# Patient Record
Sex: MALE | Race: BLACK OR AFRICAN AMERICAN | NOT HISPANIC OR LATINO | URBAN - METROPOLITAN AREA
[De-identification: names, ages, dates, MRNs, and addresses within clinical notes are randomized per-mention and may not be internally consistent; named-entity substitution may affect disease eponyms.]

---

## 2024-07-09 ENCOUNTER — APPOINTMENT (OUTPATIENT)
Dept: PRIMARY CARE | Facility: CLINIC | Age: 24
End: 2024-07-09
Payer: COMMERCIAL

## 2024-07-09 ENCOUNTER — LAB (OUTPATIENT)
Dept: LAB | Facility: LAB | Age: 24
End: 2024-07-09
Payer: COMMERCIAL

## 2024-07-09 VITALS — WEIGHT: 166 LBS | SYSTOLIC BLOOD PRESSURE: 102 MMHG | DIASTOLIC BLOOD PRESSURE: 70 MMHG | BODY MASS INDEX: 28.49 KG/M2

## 2024-07-09 DIAGNOSIS — N63.20 MASS OF LEFT BREAST, UNSPECIFIED QUADRANT: ICD-10-CM

## 2024-07-09 DIAGNOSIS — Z00.00 HEALTHCARE MAINTENANCE: ICD-10-CM

## 2024-07-09 DIAGNOSIS — Z00.00 HEALTHCARE MAINTENANCE: Primary | ICD-10-CM

## 2024-07-09 DIAGNOSIS — M79.89 LEG SWELLING: ICD-10-CM

## 2024-07-09 DIAGNOSIS — I73.9 CLAUDICATION (CMS-HCC): ICD-10-CM

## 2024-07-09 LAB
ALBUMIN SERPL BCP-MCNC: 4.6 G/DL (ref 3.4–5)
ALP SERPL-CCNC: 52 U/L (ref 33–120)
ALT SERPL W P-5'-P-CCNC: 17 U/L (ref 10–52)
ANION GAP SERPL CALC-SCNC: 13 MMOL/L (ref 10–20)
AST SERPL W P-5'-P-CCNC: 17 U/L (ref 9–39)
BASOPHILS # BLD AUTO: 0.03 X10*3/UL (ref 0–0.1)
BASOPHILS NFR BLD AUTO: 0.6 %
BILIRUB SERPL-MCNC: 0.7 MG/DL (ref 0–1.2)
BUN SERPL-MCNC: 20 MG/DL (ref 6–23)
CALCIUM SERPL-MCNC: 9.7 MG/DL (ref 8.6–10.6)
CCP IGG SERPL-ACNC: <1 U/ML
CHLORIDE SERPL-SCNC: 103 MMOL/L (ref 98–107)
CHOLEST SERPL-MCNC: 166 MG/DL (ref 0–199)
CHOLESTEROL/HDL RATIO: 3.5
CO2 SERPL-SCNC: 26 MMOL/L (ref 21–32)
CREAT SERPL-MCNC: 0.91 MG/DL (ref 0.5–1.3)
EGFRCR SERPLBLD CKD-EPI 2021: >90 ML/MIN/1.73M*2
EOSINOPHIL # BLD AUTO: 0.13 X10*3/UL (ref 0–0.7)
EOSINOPHIL NFR BLD AUTO: 2.7 %
ERYTHROCYTE [DISTWIDTH] IN BLOOD BY AUTOMATED COUNT: 12.2 % (ref 11.5–14.5)
EST. AVERAGE GLUCOSE BLD GHB EST-MCNC: 100 MG/DL
GLUCOSE SERPL-MCNC: 90 MG/DL (ref 74–99)
HBA1C MFR BLD: 5.1 %
HCT VFR BLD AUTO: 44.6 % (ref 41–52)
HDLC SERPL-MCNC: 47.6 MG/DL
HGB BLD-MCNC: 15.1 G/DL (ref 13.5–17.5)
IMM GRANULOCYTES # BLD AUTO: 0.02 X10*3/UL (ref 0–0.7)
IMM GRANULOCYTES NFR BLD AUTO: 0.4 % (ref 0–0.9)
LDLC SERPL CALC-MCNC: 105 MG/DL
LYMPHOCYTES # BLD AUTO: 2.2 X10*3/UL (ref 1.2–4.8)
LYMPHOCYTES NFR BLD AUTO: 45.1 %
MAGNESIUM SERPL-MCNC: 1.85 MG/DL (ref 1.6–2.4)
MCH RBC QN AUTO: 29 PG (ref 26–34)
MCHC RBC AUTO-ENTMCNC: 33.9 G/DL (ref 32–36)
MCV RBC AUTO: 86 FL (ref 80–100)
MONOCYTES # BLD AUTO: 0.44 X10*3/UL (ref 0.1–1)
MONOCYTES NFR BLD AUTO: 9 %
NEUTROPHILS # BLD AUTO: 2.06 X10*3/UL (ref 1.2–7.7)
NEUTROPHILS NFR BLD AUTO: 42.2 %
NON HDL CHOLESTEROL: 118 MG/DL (ref 0–149)
NRBC BLD-RTO: 0 /100 WBCS (ref 0–0)
PLATELET # BLD AUTO: 279 X10*3/UL (ref 150–450)
POTASSIUM SERPL-SCNC: 4.1 MMOL/L (ref 3.5–5.3)
PROT SERPL-MCNC: 7.3 G/DL (ref 6.4–8.2)
RBC # BLD AUTO: 5.21 X10*6/UL (ref 4.5–5.9)
RHEUMATOID FACT SER NEPH-ACNC: <10 IU/ML (ref 0–15)
SODIUM SERPL-SCNC: 138 MMOL/L (ref 136–145)
T4 FREE SERPL-MCNC: 1.42 NG/DL (ref 0.78–1.48)
TRIGL SERPL-MCNC: 68 MG/DL (ref 0–149)
TSH SERPL-ACNC: 1.27 MIU/L (ref 0.44–3.98)
URATE SERPL-MCNC: 5.8 MG/DL (ref 4–7.5)
VLDL: 14 MG/DL (ref 0–40)
WBC # BLD AUTO: 4.9 X10*3/UL (ref 4.4–11.3)

## 2024-07-09 PROCEDURE — 83036 HEMOGLOBIN GLYCOSYLATED A1C: CPT

## 2024-07-09 PROCEDURE — 86431 RHEUMATOID FACTOR QUANT: CPT

## 2024-07-09 PROCEDURE — 36415 COLL VENOUS BLD VENIPUNCTURE: CPT

## 2024-07-09 PROCEDURE — 83735 ASSAY OF MAGNESIUM: CPT

## 2024-07-09 PROCEDURE — 80053 COMPREHEN METABOLIC PANEL: CPT

## 2024-07-09 PROCEDURE — 99385 PREV VISIT NEW AGE 18-39: CPT | Performed by: INTERNAL MEDICINE

## 2024-07-09 PROCEDURE — 84439 ASSAY OF FREE THYROXINE: CPT

## 2024-07-09 PROCEDURE — 80061 LIPID PANEL: CPT

## 2024-07-09 PROCEDURE — 86200 CCP ANTIBODY: CPT

## 2024-07-09 PROCEDURE — 84550 ASSAY OF BLOOD/URIC ACID: CPT

## 2024-07-09 PROCEDURE — 85025 COMPLETE CBC W/AUTO DIFF WBC: CPT

## 2024-07-09 PROCEDURE — 84443 ASSAY THYROID STIM HORMONE: CPT

## 2024-07-09 PROCEDURE — 1036F TOBACCO NON-USER: CPT | Performed by: INTERNAL MEDICINE

## 2024-07-09 PROCEDURE — 86038 ANTINUCLEAR ANTIBODIES: CPT

## 2024-07-09 ASSESSMENT — PATIENT HEALTH QUESTIONNAIRE - PHQ9
1. LITTLE INTEREST OR PLEASURE IN DOING THINGS: NOT AT ALL
2. FEELING DOWN, DEPRESSED OR HOPELESS: NOT AT ALL
SUM OF ALL RESPONSES TO PHQ9 QUESTIONS 1 AND 2: 0

## 2024-07-09 NOTE — LETTER
July 9, 2024     Patient: Christie Levi   YOB: 2000   Date of Visit: 7/9/2024       To Whom It May Concern:    Christie Levi was seen in my clinic on 7/9/2024 at 2:15 pm for a new patient physical exam, and overall he is in good health with a normal exam.  If you have any questions or concerns, please don't hesitate to call.         Sincerely,         Benjamín Hargrove,         CC: No Recipients

## 2024-07-09 NOTE — PROGRESS NOTES
Establish care as a patent and riSubjective   Patient ID: Christie Levi is a 24 y.o. male who presents for Establish Care and right foot swollen.  HPI        Past medical history left breast hemangioma status post biopsy removal    Patient describes chronic right dorsal foot swelling relatively constant for 1.5 years grade 6 out of 10 nothing makes better or worse  He states he saw podiatrist who told him it was an overuse issue  He sometimes does get some cramping of the toes and claudication of the lower extremity  Chronic paresthesias digits 4 and 5 metatarsals  Denies any redness fever chills trauma paralysis orthopnea PND chest pain dyspnea        Health Maintenance:      Colonoscopy:      Mammogram:      Pelvic/Pap:      Low dose chest CT:      Aorta duplex:      Optho:      Podiatry:        Vaccines:      Refer to Epic Vaccination Log        ROS:      General: denies fever/chills/weight loss      Head: denies HA/trauma/masses/dizziness      Eyes: denies vision change/loss of vision/blurry vision/diplopia/eye pain      Ears: denies hearing loss/tinnitus/otalgia/otorrhea      Nose: denies nasal drainage/anosmia      Throat: denies dysphagia/odynophagia      Lymphatics: denies lymph node swelling      Breast: Occasional soreness persistent on the left pectoralis persists after having a breast mass removed there there was a hemangioma sometimes feels inflamed after sports denies masses/discharge/dimpling/skin changes      Cardiac: denies CP/palpitations/orthopnea/PND      Pulmonary: denies dyspnea/cough/wheezing      GI: denies abd pain/n/v/diarrhea/melena/hematochezia/hematemesis      : denies dysuria/hematuria/change frequency      Genital: Had a lump in his left testicle feels like it was an ingrown hair that went away at the time denies genital discharge/lesions      Skin: denies rashes/lesions/masses      MSK: Chronic swelling of the right dorsal foot for a year and a half denies  "weakness/swelling/edema/gait imbalance/pain      Neuro: Chronic paresthesias tarsals 4 and 5 bilateral right greater than left denies paresthesias/seizures/dysarthria      Psych: denies depression/anxiety/suicidal or homicidal ideations            Objective   /70   Wt 75.3 kg (166 lb)   BMI 28.49 kg/m²      Physical Exam:     General: AO3, NAD     Head: atraumatic/NC     Eyes: EOMI/PERRLA. Negative APD     Ears: TM pearly gray, EAC clear. No lesions or erythema     Nose: symmetric nares, no discharge     Throat: trachea midline, uvula midline pink mucosa. No thyromegaly     Lymphatics: no cervical/supraclavicular/ant or posterior cervical adenopathy/axillary/inguinal adenopathy     Breast: not examined     Chest: no deformity or tenderness to palpation     Pulm: CTA b/l, no wheeze/rhonchi/rales. nonlabored     Cardiac: RRR +s1s2, no m/r/g.      GI: soft, NT/ND. Normoactive Bsx4. No rebound/guarding.     Rectal: not examined     Genital: not examined     MSK: Trace right lateral dorsal edema of foot nonpitting 5/5 strength UE LE. No edema/clubbing/cyanosis negative Homans bilateral     Skin: no rashes/lesions     Vascular: 1+ palp DP PT radials b/l. Negative carotid bruit     Neuro: CNII-XII intact. No focal deficits. Reflexes 2/4 brachioradialis bicep tricep patellar achilles. Finger to nose intact.     Psych: appropriate mood/affect                    No results found for: \"BMPR1A\", \"CBCDIF\"  Patient requested a physical exam today as well as a work note stating he had a physical exam    Assessment/Plan   Diagnoses and all orders for this visit:  Healthcare maintenance  -     CBC and Auto Differential; Future  -     Comprehensive Metabolic Panel; Future  -     Hemoglobin A1C; Future  -     Lipid Panel; Future  -     Thyroxine, Free; Future  -     Thyroid Stimulating Hormone; Future  -     Magnesium; Future  -     Uric Acid; Future  -     ROBERT with Reflex to JEAN-PAUL; Future  -     Rheumatoid Factor; Future  -     " Citrulline Antibody, IgG; Future  Mass of left breast, unspecified quadrant  -     BI US breast complete bilateral; Future  Claudication (CMS-HCC)  Comments:  Rule out PVD  Orders:  -     Vascular US ankle brachial index (WALLACE) without exercise; Future  Leg swelling  Comments:  Right greater than left inflammatory arthropathy venous stasis less likely other CHF tendinitis  Orders:  -     Transthoracic Echo (TTE) Complete; Future  -     Vascular US lower extremity venous insufficiency bilateral; Future    Printed out work note for the requested physical exam    Thank you for making appointment today Avelinainosiomara    Please follow-up 3 months    Benjamín Hargrove DO, [facility] Austin foot swelling.

## 2024-07-11 LAB — ANA SER QL HEP2 SUBST: NEGATIVE

## 2024-07-12 ENCOUNTER — APPOINTMENT (OUTPATIENT)
Dept: VASCULAR MEDICINE | Facility: HOSPITAL | Age: 24
End: 2024-07-12
Payer: COMMERCIAL

## 2024-07-17 ENCOUNTER — HOSPITAL ENCOUNTER (OUTPATIENT)
Dept: RADIOLOGY | Facility: HOSPITAL | Age: 24
Discharge: HOME | End: 2024-07-17
Payer: COMMERCIAL

## 2024-07-17 ENCOUNTER — HOSPITAL ENCOUNTER (OUTPATIENT)
Dept: VASCULAR MEDICINE | Facility: HOSPITAL | Age: 24
Discharge: HOME | End: 2024-07-17
Payer: COMMERCIAL

## 2024-07-17 DIAGNOSIS — N63.20 MASS OF LEFT BREAST, UNSPECIFIED QUADRANT: ICD-10-CM

## 2024-07-17 DIAGNOSIS — N64.4 BREAST PAIN, LEFT: ICD-10-CM

## 2024-07-17 DIAGNOSIS — I73.9 CLAUDICATION (CMS-HCC): ICD-10-CM

## 2024-07-17 PROCEDURE — 93922 UPR/L XTREMITY ART 2 LEVELS: CPT | Performed by: INTERNAL MEDICINE

## 2024-07-17 PROCEDURE — 93922 UPR/L XTREMITY ART 2 LEVELS: CPT

## 2024-07-17 PROCEDURE — 76642 ULTRASOUND BREAST LIMITED: CPT | Mod: LT

## 2024-07-17 PROCEDURE — 76642 ULTRASOUND BREAST LIMITED: CPT | Mod: LEFT SIDE | Performed by: RADIOLOGY

## 2024-07-17 PROCEDURE — 76982 USE 1ST TARGET LESION: CPT | Mod: LT

## 2024-07-18 ENCOUNTER — APPOINTMENT (OUTPATIENT)
Dept: VASCULAR MEDICINE | Facility: HOSPITAL | Age: 24
End: 2024-07-18
Payer: COMMERCIAL

## 2024-07-19 ENCOUNTER — HOSPITAL ENCOUNTER (OUTPATIENT)
Dept: VASCULAR MEDICINE | Facility: HOSPITAL | Age: 24
Discharge: HOME | End: 2024-07-19
Payer: COMMERCIAL

## 2024-07-19 ENCOUNTER — LAB (OUTPATIENT)
Dept: LAB | Facility: LAB | Age: 24
End: 2024-07-19
Payer: COMMERCIAL

## 2024-07-19 DIAGNOSIS — M79.89 LEG SWELLING: ICD-10-CM

## 2024-07-19 LAB — COTININE UR QL SCN: NEGATIVE

## 2024-07-19 PROCEDURE — 93970 EXTREMITY STUDY: CPT

## 2024-08-06 ENCOUNTER — APPOINTMENT (OUTPATIENT)
Dept: CARDIOLOGY | Facility: HOSPITAL | Age: 24
End: 2024-08-06
Payer: COMMERCIAL

## 2024-08-07 ENCOUNTER — HOSPITAL ENCOUNTER (OUTPATIENT)
Dept: CARDIOLOGY | Facility: HOSPITAL | Age: 24
Discharge: HOME | End: 2024-08-07
Payer: COMMERCIAL

## 2024-08-07 DIAGNOSIS — M79.89 LEG SWELLING: ICD-10-CM

## 2024-08-07 DIAGNOSIS — R60.0 LOCALIZED EDEMA: ICD-10-CM

## 2024-08-07 PROCEDURE — 93306 TTE W/DOPPLER COMPLETE: CPT

## 2024-08-07 PROCEDURE — 93306 TTE W/DOPPLER COMPLETE: CPT | Performed by: INTERNAL MEDICINE

## 2024-08-08 LAB
AORTIC VALVE MEAN GRADIENT: 3.1 MMHG
AORTIC VALVE PEAK VELOCITY: 1.22 M/S
AV PEAK GRADIENT: 6 MMHG
AVA (PEAK VEL): 2.58 CM2
AVA (VTI): 2.58 CM2
EJECTION FRACTION APICAL 4 CHAMBER: 65.1
EJECTION FRACTION: 63 %
LEFT ATRIUM VOLUME AREA LENGTH INDEX BSA: 31 ML/M2
LEFT VENTRICLE INTERNAL DIMENSION DIASTOLE: 5.28 CM (ref 3.5–6)
LEFT VENTRICULAR OUTFLOW TRACT DIAMETER: 2 CM
MITRAL VALVE E/A RATIO: 2.31
RIGHT VENTRICLE FREE WALL PEAK S': 15 CM/S
TRICUSPID ANNULAR PLANE SYSTOLIC EXCURSION: 2.8 CM

## 2024-09-16 ENCOUNTER — APPOINTMENT (OUTPATIENT)
Dept: PRIMARY CARE | Facility: CLINIC | Age: 24
End: 2024-09-16
Payer: COMMERCIAL

## 2024-10-02 ENCOUNTER — PATIENT OUTREACH (OUTPATIENT)
Dept: PRIMARY CARE | Facility: CLINIC | Age: 24
End: 2024-10-02
Payer: COMMERCIAL

## 2024-10-02 ENCOUNTER — DOCUMENTATION (OUTPATIENT)
Dept: PRIMARY CARE | Facility: CLINIC | Age: 24
End: 2024-10-02
Payer: COMMERCIAL

## 2024-10-02 RX ORDER — ASPIRIN 81 MG/1
81 TABLET ORAL DAILY
COMMUNITY

## 2024-10-02 RX ORDER — CLOPIDOGREL BISULFATE 75 MG/1
75 TABLET ORAL DAILY
COMMUNITY

## 2024-10-02 NOTE — PROGRESS NOTES
Discharge Facility:[facility]   Discharge Diagnosis:Numbness   Admission Date:9/30/2024  Discharge Date: 10/1/2024    PCP Appointment Date:KENIA  Specialist Appointment Date: 10/10/2024 Cerebrovascular/[facility] Encounter and Summary Linked: Yes  See discharge assessment below for further details  Engagement  Call Start Time: 1553 (10/2/2024  4:02 PM)    Medications  Medications reviewed with patient/caregiver?: Yes (10/2/2024  4:02 PM)  Is the patient having any side effects they believe may be caused by any medication additions or changes?: No (10/2/2024  4:02 PM)  Does the patient have all medications ordered at discharge?: No (10/2/2024  4:02 PM)  What is keeping the patient from filling the prescriptions?: -- (Did not  Plavix or Aspirin. We reviewed the importance of taking and prevention. He states has been moving, so no time for. He says will get today.) (10/2/2024  4:02 PM)  Care Management Interventions: Provided patient education (10/2/2024  4:02 PM)  Prescription Comments: -- (ASA 81 mg one qd, Plavix 75 mg one qd x 21 days.) (10/2/2024  4:02 PM)  Is the patient taking all medications as directed (includes completed medication regime)?: No (as above) (10/2/2024  4:02 PM)  What is preventing the patient from taking all medications as directed?: Doesn't understand importance; Other (Comment) (states too busy, is moving.) (10/2/2024  4:02 PM)  Care Management Interventions: Provided patient education (Strongly encouraged to  meds and begin taking asap) (10/2/2024  4:02 PM)    Appointments  Does the patient have a primary care provider?: Yes (10/2/2024  4:02 PM)  Care Management Interventions: -- (He will schedule when gets some free time. He is moving.) (10/2/2024  4:02 PM)  Has the patient kept scheduled appointments due by today?: No (10/2/2024  4:02 PM)  What is preventing the patient from keeping their appointments?: -- (Was not able to get off of work.) (10/2/2024  4:02  PM)    Self Management  What is the home health agency?: -- (N/A) (10/2/2024  4:02 PM)  What Durable Medical Equipment (DME) was ordered?: -- (N/A) (10/2/2024  4:02 PM)    Patient Teaching  Does the patient have access to their discharge instructions?: Yes (10/2/2024  4:02 PM)  Care Management Interventions: Reviewed instructions with patient (10/2/2024  4:02 PM)  What is the patient's perception of their health status since discharge?: Improving (10/2/2024  4:02 PM)  Is the patient/caregiver able to teach back the hierarchy of who to call/visit for symptoms/problems? PCP, Specialist, Home Health nurse, Urgent Care, ED, 911: Yes (10/2/2024  4:02 PM)    Wrap Up  Wrap Up Additional Comments: -- (Christie was moving during outreach. He states is better but still some numbness. He has not started meds, we had discussion of importance of getting asap, he verbalized understanding and states he will get this evening.) (10/2/2024  4:02 PM)

## 2024-10-16 ENCOUNTER — PATIENT OUTREACH (OUTPATIENT)
Dept: PRIMARY CARE | Facility: CLINIC | Age: 24
End: 2024-10-16
Payer: COMMERCIAL

## 2024-11-18 ENCOUNTER — PATIENT OUTREACH (OUTPATIENT)
Dept: PRIMARY CARE | Facility: CLINIC | Age: 24
End: 2024-11-18
Payer: COMMERCIAL

## 2024-12-27 ENCOUNTER — PATIENT OUTREACH (OUTPATIENT)
Dept: PRIMARY CARE | Facility: CLINIC | Age: 24
End: 2024-12-27

## 2024-12-27 ENCOUNTER — APPOINTMENT (OUTPATIENT)
Dept: PRIMARY CARE | Facility: CLINIC | Age: 24
End: 2024-12-27
Payer: COMMERCIAL

## 2024-12-27 VITALS — WEIGHT: 167 LBS | BODY MASS INDEX: 28.67 KG/M2 | SYSTOLIC BLOOD PRESSURE: 100 MMHG | DIASTOLIC BLOOD PRESSURE: 68 MMHG

## 2024-12-27 DIAGNOSIS — F52.4 PREMATURE EJACULATION: ICD-10-CM

## 2024-12-27 DIAGNOSIS — R29.810 FACIAL DROOP: Primary | ICD-10-CM

## 2024-12-27 DIAGNOSIS — D18.01 HEMANGIOMA OF SUBCUTANEOUS TISSUE: ICD-10-CM

## 2024-12-27 PROCEDURE — 99215 OFFICE O/P EST HI 40 MIN: CPT | Performed by: INTERNAL MEDICINE

## 2024-12-27 PROCEDURE — 1036F TOBACCO NON-USER: CPT | Performed by: INTERNAL MEDICINE

## 2024-12-27 RX ORDER — NAPROXEN SODIUM 220 MG/1
81 TABLET, FILM COATED ORAL DAILY
Qty: 90 TABLET | Refills: 1 | Status: SHIPPED | OUTPATIENT
Start: 2024-12-27 | End: 2025-03-27

## 2024-12-27 ASSESSMENT — PATIENT HEALTH QUESTIONNAIRE - PHQ9
2. FEELING DOWN, DEPRESSED OR HOPELESS: NOT AT ALL
SUM OF ALL RESPONSES TO PHQ9 QUESTIONS 1 AND 2: 0
1. LITTLE INTEREST OR PLEASURE IN DOING THINGS: NOT AT ALL

## 2024-12-27 NOTE — PROGRESS NOTES
Establish care as a patent and riSubjective   Patient ID: Christie Levi is a 24 y.o. male who presents for Follow-up.  HPI        Past medical history left breast hemangioma status post biopsy removal  ER presentation September 30, 2024 left facial droop numbness CT head negative CTA head and neck negative MRI brain negative dosed with 21 days of aspirin Plavix    Patient states he had left facial droop in September with paresthesias now resolved grade 0 out of 10 nothing made worse better after the aspirin Plavix  Denies dysarthria dysphagia nausea vomiting fever chills vision changes diplopia          Health Maintenance:      Colonoscopy:      Mammogram:      Pelvic/Pap:      Low dose chest CT:      Aorta duplex:      Optho:      Podiatry:        Vaccines:      Refer to Epic Vaccination Log        ROS:      General: denies fever/chills/weight loss      Head: denies HA/trauma/masses/dizziness      Eyes: denies vision change/loss of vision/blurry vision/diplopia/eye pain      Ears: denies hearing loss/tinnitus/otalgia/otorrhea      Nose: denies nasal drainage/anosmia      Throat: denies dysphagia/odynophagia      Lymphatics: denies lymph node swelling      Breast: Occasional soreness persistent on the left pectoralis persists after having a breast mass removed there there was a hemangioma sometimes feels inflamed after sports or when he works out worse to the touch only still aggravates them sometimes denies masses/discharge/dimpling/skin changes      Cardiac: denies CP/palpitations/orthopnea/PND      Pulmonary: denies dyspnea/cough/wheezing      GI: denies abd pain/n/v/diarrhea/melena/hematochezia/hematemesis      : Premature ejaculation during intercourse denies dysuria/hematuria/change frequency      Genital: Had a lump in his left testicle feels like it was an ingrown hair that went away at the time denies genital discharge/lesions      Skin: denies rashes/lesions/masses      MSK: denies  "weakness/swelling/edema/gait imbalance/pain      Neuro: Chronic paresthesias tarsals 4 and 5 bilateral right greater than left better at present ; left facial droop with paresthesias now resolved denies paresthesias/seizures/dysarthria      Psych: denies depression/anxiety/suicidal or homicidal ideations            Objective   /68   Wt 75.8 kg (167 lb)   BMI 28.67 kg/m²      Physical Exam:     General: AO3, NAD     Head: atraumatic/NC     Eyes: EOMI/PERRLA. Negative APD     Ears: TM pearly gray, EAC clear. No lesions or erythema     Nose: symmetric nares, no discharge     Throat: trachea midline, uvula midline pink mucosa. No thyromegaly     Lymphatics: no cervical/supraclavicular/ant or posterior cervical adenopathy/axillary/inguinal adenopathy     Breast: Mild tender palpation overlying hemangioma on the left breast no palpable masses or overlying erythema fluctuance or induration     Chest: no deformity or tenderness to palpation     Pulm: CTA b/l, no wheeze/rhonchi/rales. nonlabored     Cardiac: RRR +s1s2, no m/r/g.      GI: soft, NT/ND. Normoactive Bsx4. No rebound/guarding.     Rectal: not examined     Genital: not examined     MSK: Trace right lateral dorsal edema of foot nonpitting 5/5 strength UE LE. No edema/clubbing/cyanosis negative Homans bilateral     Skin: no rashes/lesions     Vascular: 2+ palp DP PT radials b/l. Negative carotid bruit     Neuro: CNII-XII intact. No focal deficits. Reflexes 2/4 brachioradialis bicep tricep patellar achilles. Finger to nose intact.     Psych: appropriate mood/affect                    No results found for: \"BMPR1A\", \"CBCDIF\"  Patient deferred general surgery referral at this time for evaluation of the pectoralis mass hemangioma I discussed with him there is likely neuritis at the location of the hemangioma so surgical excision may be the only option    Patient deferred urology referral at this time    Assessment/Plan   Diagnoses and all orders for this " visit:  Facial droop  Comments:  Possible TIA versus other resolved  Orders:  -     Referral to Neurology; Future  -     aspirin 81 mg chewable tablet; Chew 1 tablet (81 mg) once daily.  Premature ejaculation  Hemangioma of subcutaneous tissue  Comments:  Left chest causing secondary neuritis pain likely    Call 911 for any recurrent facial droop or strokelike symptoms  Continue aspirin 81 mg daily for prevention    Try over-the-counter lubricant with the some anesthetic in it such as Trojan with the purple bottle other option would to be a trial of an SSRI such as Paxil    Screening blood work due July 2025    Thank you for making appointment today [facility]    Please follow-up 6 months    Benjamín Hargrove DO, Juan Austin foot swelling.